# Patient Record
Sex: FEMALE | URBAN - METROPOLITAN AREA
[De-identification: names, ages, dates, MRNs, and addresses within clinical notes are randomized per-mention and may not be internally consistent; named-entity substitution may affect disease eponyms.]

---

## 2021-12-18 ENCOUNTER — LAB OUTSIDE AN ENCOUNTER (OUTPATIENT)
Dept: URBAN - METROPOLITAN AREA CLINIC 44 | Facility: CLINIC | Age: 56
End: 2021-12-18

## 2022-10-11 ENCOUNTER — APPOINTMENT (RX ONLY)
Dept: URBAN - METROPOLITAN AREA CLINIC 328 | Facility: CLINIC | Age: 57
Setting detail: DERMATOLOGY
End: 2022-10-11

## 2022-10-11 DIAGNOSIS — D22 MELANOCYTIC NEVI: ICD-10-CM

## 2022-10-11 DIAGNOSIS — L81.4 OTHER MELANIN HYPERPIGMENTATION: ICD-10-CM

## 2022-10-11 DIAGNOSIS — L82.1 OTHER SEBORRHEIC KERATOSIS: ICD-10-CM

## 2022-10-11 DIAGNOSIS — D18.0 HEMANGIOMA: ICD-10-CM

## 2022-10-11 PROBLEM — D22.5 MELANOCYTIC NEVI OF TRUNK: Status: ACTIVE | Noted: 2022-10-11

## 2022-10-11 PROBLEM — L64.9 ANDROGENIC ALOPECIA, UNSPECIFIED: Status: ACTIVE | Noted: 2022-10-11

## 2022-10-11 PROBLEM — D18.01 HEMANGIOMA OF SKIN AND SUBCUTANEOUS TISSUE: Status: ACTIVE | Noted: 2022-10-11

## 2022-10-11 PROCEDURE — ? IN-HOUSE DISPENSING PHARMACY

## 2022-10-11 PROCEDURE — ? TREATMENT REGIMEN

## 2022-10-11 PROCEDURE — 99203 OFFICE O/P NEW LOW 30 MIN: CPT | Mod: 25

## 2022-10-11 PROCEDURE — ? INTRALESIONAL KENALOG

## 2022-10-11 PROCEDURE — 11900 INJECT SKIN LESIONS </W 7: CPT

## 2022-10-11 PROCEDURE — ? FULL BODY SKIN EXAM

## 2022-10-11 PROCEDURE — ? COUNSELING

## 2022-10-11 PROCEDURE — ? ADDITIONAL NOTES

## 2022-10-11 ASSESSMENT — LOCATION DETAILED DESCRIPTION DERM
LOCATION DETAILED: EPIGASTRIC SKIN
LOCATION DETAILED: RIGHT SUPERIOR MEDIAL MIDBACK
LOCATION DETAILED: LEFT ANTERIOR SHOULDER
LOCATION DETAILED: LEFT MID-UPPER BACK

## 2022-10-11 ASSESSMENT — LOCATION ZONE DERM
LOCATION ZONE: TRUNK
LOCATION ZONE: ARM

## 2022-10-11 ASSESSMENT — LOCATION SIMPLE DESCRIPTION DERM
LOCATION SIMPLE: RIGHT LOWER BACK
LOCATION SIMPLE: ABDOMEN
LOCATION SIMPLE: LEFT UPPER BACK
LOCATION SIMPLE: LEFT SHOULDER

## 2022-10-11 NOTE — PROCEDURE: INTRALESIONAL KENALOG
normal...
How Many Mls Were Removed From The 40 Mg/Ml (5ml) Vial When Preparing The Injectable Solution?: 0
Detail Level: Detailed
Medical Necessity Clause: This procedure was medically necessary because the lesions that were treated were:
Which Kenalog Vial Was Used?: Kenalog 10 mg/ml (5 ml vial)
Concentration Of Solution Injected (Mg/Ml): 10.0
Kenalog Preparation: Kenalog
Validate Note Data When Using Inventory: Yes
Total Volume Injected (Ccs- Only Use Numbers And Decimals): 0.7
Consent: The risks of atrophy were reviewed with the patient.
Include Z78.9 (Other Specified Conditions Influencing Health Status) As An Associated Diagnosis?: No

## 2022-10-11 NOTE — PROCEDURE: IN-HOUSE DISPENSING PHARMACY
Product 48 Units Dispensed: 0
Product 17 Unit Type: bottle(s)
Product 1 Price/Unit (In Dollars): 55
Product 15 Amount/Unit (Numbers Only): 1
Product 7 Application Directions: Apply to the nails daily. Please do not apply socks for about5 hours (best if applied at night) Remove once weekly then start again.
Product 78 Unit Type: mg
Product 3 Application Directions: Apply to the face once daily AM or PM or as directed by provider.
Product 13 Application Directions: Apply to the face twice daily or as directed by provider.
Name Of Product 12: #12 Melasma Emulsion
Name Of Product 24: #24 Spironolactone
Product 22 Price/Unit (In Dollars): 10
Name Of Product 18: #18 Bactrim
Product 20 Price/Unit (In Dollars): 15
Name Of Product 14: #14 Rosacea Silicone Gel
Product 10 Price/Unit (In Dollars): 35
Name Of Product 8: #8 Anti Fungal Cream
Name Of Product 4: #4 Acne Gel
Product 22 Amount/Unit (Numbers Only): 18
Product 20 Amount/Unit (Numbers Only): 30
Product 18 Application Directions: Take one tablet twice daily with food or as directed by provider.
Product 12 Application Directions: Apply to the face three times weekly at night for two months only. Take a two month break and then continue for two months again. (On your office months, you can apply Lytera daily)
Product 24 Application Directions: Take one tablet twice daily or as directed by provider.
Product 6 Refills: 3
Product 18 Unit Type: tablets
Detail Level: Zone
Product 2 Price/Unit (In Dollars): 50
Product 6 Application Directions: Apply to the scalp three times weekly
Name Of Product 23: #23 Prednisone 50mg
Name Of Product 21: #21 Minocycline
Product 2 Application Directions: Apply to the face at bedtime. Start every other night then increase usage depending on tolerance.
Name Of Product 11: #11 Antibacterial Ointment
Product 24 Amount/Unit (Numbers Only): 60
Product 21 Price/Unit (In Dollars): 45
Name Of Product 17: #17 Xerosis Gel
Name Of Product 7: #7 Anti Fungal Nail Solution
Product 23 Application Directions: Use as directed by provider.
Name Of Product 13: #13 Rosacea Cream
Product 24 Unit Type: capsules
Name Of Product 3: #3 Acne Gel Combo
Product 17 Application Directions: Apply to affected area nightly.
Product 11 Application Directions: Apply twice daily to wound.
Product 9 Application Directions: Apply to the affected area three times weekly.
Product 15 Application Directions: Apply to the scalp 2-3 times weekly. Can be applied every other shampoo as well.
Product 5 Application Directions: For Warts/MC- apply to the areas 2-3 times weekly\\nFor (Pre)Skin Cancer- apply nightly for 2-6 weeks or as directed by provider.
Name Of Product 22: #22 Prednisone 20mg
Name Of Product 20: #20 Loratadine
Product 1 Application Directions: Apply to the face twice daily (if another rx is being prescribed it will be applied in the AM)
Name Of Product 16: #16 Dermatitis Topical Solution
Name Of Product 10: #10 Fungal Dermatitis Cream
Name Of Product 6: #6 Alopecia Topical Solution
Product 20 Application Directions: Take one tablet daily.
Name Of Product 2: #2 Acne Moisturizing Cream
Product 16 Application Directions: Apply to the affected area 1-2 times daily or as directed by provider.
Product 10 Application Directions: Apply to the affected area twice daily for 10 days.
Product 18 Amount/Unit (Numbers Only): 20
Product 8 Application Directions: Apply to the affected area twice daily.
Product 14 Application Directions: Apply to the face once daily AM or PM
Product 4 Application Directions: Apply to the face at bedtime or as directed by provider.
Name Of Product 19: #19 Doxycycline
Send Charges To Patient Encounter: Yes
Render Product Pricing In Note: No
Name Of Product 15: #15 Anti Fungal Shampoo
Name Of Product 5: #5 Actinic Keratosis (Gel)
Name Of Product 9: #9 Dermatitis Cream
Product 23 Amount/Unit (Numbers Only): 5
Product 21 Application Directions: Take one tablet twice daily with food.
Product 3 Price/Unit (In Dollars): 40
Name Of Product 1: #1 Acne Gel

## 2022-10-11 NOTE — HPI: EVALUATION OF SKIN LESION(S)
What Type Of Note Output Would You Prefer (Optional)?: Bullet Format
Hpi Title: Evaluation of Skin Lesions
Additional History: Check poss hair loss Present for years